# Patient Record
Sex: MALE | NOT HISPANIC OR LATINO | URBAN - NONMETROPOLITAN AREA
[De-identification: names, ages, dates, MRNs, and addresses within clinical notes are randomized per-mention and may not be internally consistent; named-entity substitution may affect disease eponyms.]

---

## 2018-05-08 ENCOUNTER — APPOINTMENT (RX ONLY)
Dept: URBAN - NONMETROPOLITAN AREA CLINIC 30 | Facility: CLINIC | Age: 28
Setting detail: DERMATOLOGY
End: 2018-05-08

## 2018-05-08 VITALS — SYSTOLIC BLOOD PRESSURE: 130 MMHG | HEIGHT: 73 IN | WEIGHT: 210 LBS | DIASTOLIC BLOOD PRESSURE: 80 MMHG

## 2018-05-08 DIAGNOSIS — L663 OTHER SPECIFIED DISEASES OF HAIR AND HAIR FOLLICLES: ICD-10-CM

## 2018-05-08 DIAGNOSIS — L30.1 DYSHIDROSIS [POMPHOLYX]: ICD-10-CM

## 2018-05-08 DIAGNOSIS — L73.9 FOLLICULAR DISORDER, UNSPECIFIED: ICD-10-CM

## 2018-05-08 DIAGNOSIS — L738 OTHER SPECIFIED DISEASES OF HAIR AND HAIR FOLLICLES: ICD-10-CM

## 2018-05-08 PROBLEM — L02.02 FURUNCLE OF FACE: Status: ACTIVE | Noted: 2018-05-08

## 2018-05-08 PROCEDURE — ? PRESCRIPTION

## 2018-05-08 PROCEDURE — ? INTRALESIONAL KENALOG

## 2018-05-08 PROCEDURE — 11900 INJECT SKIN LESIONS </W 7: CPT

## 2018-05-08 PROCEDURE — ? COUNSELING

## 2018-05-08 RX ORDER — CLOBETASOL PROPIONATE 0.5 MG/G
CREAM TOPICAL BID
Qty: 1 | Refills: 3 | Status: ERX | COMMUNITY
Start: 2018-05-08

## 2018-05-08 RX ADMIN — CLOBETASOL PROPIONATE: 0.5 CREAM TOPICAL at 17:25

## 2018-05-08 ASSESSMENT — LOCATION SIMPLE DESCRIPTION DERM
LOCATION SIMPLE: RIGHT CHEEK
LOCATION SIMPLE: LEFT PLANTAR SURFACE

## 2018-05-08 ASSESSMENT — LOCATION DETAILED DESCRIPTION DERM
LOCATION DETAILED: RIGHT INFERIOR MEDIAL BUCCAL CHEEK
LOCATION DETAILED: LEFT PLANTAR FOREFOOT OVERLYING 2ND METATARSAL

## 2018-05-08 ASSESSMENT — LOCATION ZONE DERM
LOCATION ZONE: FEET
LOCATION ZONE: FACE

## 2018-05-08 NOTE — PROCEDURE: INTRALESIONAL KENALOG
Concentration Of Kenalog Solution Injected (Mg/Ml): 0.5
Expiration Date For Kenalog (Optional): 02/2019
X Size Of Lesion In Cm (Optional): 0
Include Z78.9 (Other Specified Conditions Influencing Health Status) As An Associated Diagnosis?: No
Administered By (Optional): Dr. Tracy Oneal MD
Kenalog Preparation: Kenalog with normal saline
Detail Level: Detailed
Total Volume (Ccs): 0.6
Lot # For Kenalog (Optional): [id number]
Medical Necessity Clause: This procedure was medically necessary because the lesions that were treated were
Consent: The risks of atrophy were reviewed with the patient.

## 2018-11-02 ENCOUNTER — RX ONLY (OUTPATIENT)
Age: 28
Setting detail: RX ONLY
End: 2018-11-02

## 2018-11-02 RX ORDER — CLOBETASOL PROPIONATE 0.5 MG/G
CREAM TOPICAL BID
Qty: 1 | Refills: 1 | Status: ERX

## 2019-02-21 ENCOUNTER — APPOINTMENT (RX ONLY)
Dept: URBAN - NONMETROPOLITAN AREA CLINIC 30 | Facility: CLINIC | Age: 29
Setting detail: DERMATOLOGY
End: 2019-02-21

## 2019-02-21 VITALS — HEIGHT: 72 IN | SYSTOLIC BLOOD PRESSURE: 126 MMHG | WEIGHT: 215 LBS | DIASTOLIC BLOOD PRESSURE: 80 MMHG

## 2019-02-21 DIAGNOSIS — L30.1 DYSHIDROSIS [POMPHOLYX]: ICD-10-CM

## 2019-02-21 PROCEDURE — ? IN-HOUSE DISPENSING PHARMACY

## 2019-02-21 PROCEDURE — ? PRESCRIPTION

## 2019-02-21 PROCEDURE — ? COUNSELING

## 2019-02-21 PROCEDURE — ? PATIENT SPECIFIC COUNSELING

## 2019-02-21 PROCEDURE — 99213 OFFICE O/P EST LOW 20 MIN: CPT

## 2019-02-21 PROCEDURE — ? ORDER TESTS

## 2019-02-21 RX ORDER — DOXYCYCLINE HYCLATE 100 MG/1
CAPSULE, GELATIN COATED ORAL
Qty: 14 | Refills: 0 | Status: ERX | COMMUNITY
Start: 2019-02-21

## 2019-02-21 RX ORDER — PREDNISONE 10 MG/1
TABLET ORAL
Qty: 20 | Refills: 0 | Status: ERX | COMMUNITY
Start: 2019-02-21

## 2019-02-21 RX ADMIN — PREDNISONE: 10 TABLET ORAL at 19:18

## 2019-02-21 RX ADMIN — DOXYCYCLINE HYCLATE: 100 CAPSULE, GELATIN COATED ORAL at 19:19

## 2019-02-21 ASSESSMENT — LOCATION DETAILED DESCRIPTION DERM
LOCATION DETAILED: RIGHT PLANTAR FOREFOOT OVERLYING 3RD METATARSAL
LOCATION DETAILED: LEFT MEDIAL PLANTAR MIDFOOT
LOCATION DETAILED: LEFT INSTEP

## 2019-02-21 ASSESSMENT — LOCATION SIMPLE DESCRIPTION DERM
LOCATION SIMPLE: RIGHT PLANTAR SURFACE
LOCATION SIMPLE: LEFT PLANTAR SURFACE

## 2019-02-21 ASSESSMENT — LOCATION ZONE DERM: LOCATION ZONE: FEET

## 2019-02-21 NOTE — PROCEDURE: PATIENT SPECIFIC COUNSELING
Detail Level: Zone
Gilberto Marino MD in to see patient and recommended treatment options. Advised once patient’s hands and feet are healed he considered using qbrexa and glycopyrolate. Patient will follow up in one week.

## 2019-02-21 NOTE — PROCEDURE: IN-HOUSE DISPENSING PHARMACY
Product 36 Unit Type: mg
Product 61 Refills: 0
Render Product Pricing In Note: Yes
Product 8 Unit Type: grams
Product 12 Amount/Unit (Numbers Only): 30
Product 1 Price/Unit (In Dollars): 35.00
Name Of Product 26: Fungal Dermatitis Cream
Name Of Product 28: Triamcinolone Cream
Product 2 Price/Unit (In Dollars): 50.00
Product 9 [place]arez/Unit (In Dollars): 45.00
Name Of Product 22: Clobetasol Cream
Detail Level: Zone
Name Of Product 2: Acne Triple Gel Combination
Name Of Product 31: Eczema Moisturizing Cream
Product 29 Price/Unit (In Dollars): 65.00
Name Of Product 8: Tretinoin 0.025% / Clindamycin Combination Cream
Product 28 Price/Unit (In Dollars): 30.00
Product 23 Refills: 3
Product 26 Price/Unit (In Dollars): 40.00
Product 32 Refills: 2
Product 8 Application Directions: Apply nightly to face.
Product 5 Amount/Unit (Numbers Only): 120
Product 4 Refills: 5
Name Of Product 12: Rosacea Triple Combination Gel
Name Of Product 41: AK Imiquimod Gel
Product 4 Application Directions: Apply bid to face
Product 24 Amount/Unit (Numbers Only): 60
Name Of Product 3: Adapalene 0.3% Gel
Name Of Product 5: BP 8% Acne Wash
Name Of Product 27: Hydrocortisone 2.5% / Tranilast 0.5% / Levo 2%
Name Of Product 6: Clindamycin Gel
Name Of Product 32: Tacrolimus 0.1% Ointment
Product 42 Application Directions: Apply every third night for two weeks.  Then apply every other night for two weeks and then nightly as tolerated.
Name Of Product 24: Fluocinolone Scalp and Body Oil
Name Of Product 42: Skin Brightening Hydroquinone 8% Combination
Name Of Product 11: Metronidazole Gel
Product 9 Application Directions: Apply pea sized amount at night to entire face. Start every other night and increase to nightly if well-tolerated
Product 32 Application Directions: Twice a day to affected areas of vitiligo on hands
Name Of Product 23: Clobetasol Ointment
Name Of Product 9: Hydrating 0.05% Tretinoin Cream
Name Of Product 4: BP 2.5% / Clindamycin Combination Gel
Name Of Product 21: Anti-Fungal Shampoo
Product 23 Units Dispensed: 1
Name Of Product 7: Dapsone 6% Gel
Name Of Product 1: Acne Rosacea Lotion Cleanser
Name Of Product 25: Fluocinonide Cream

## 2019-02-21 NOTE — PROCEDURE: MIPS QUALITY
Quality 265: Biopsy Follow-Up: Biopsy results reviewed, communicated, tracked, and documented
Detail Level: Generalized
Quality 130: Documentation Of Current Medications In The Medical Record: Current Medications Documented
Quality 431: Preventive Care And Screening: Unhealthy Alcohol Use - Screening: Patient screened for unhealthy alcohol use using a single question and scores less than 2 times per year
Quality 128: Preventive Care And Screening: Body Mass Index (Bmi) Screening And Follow-Up Plan: BMI is documented within normal parameters and no follow-up plan is required.

## 2019-02-28 ENCOUNTER — APPOINTMENT (RX ONLY)
Dept: URBAN - NONMETROPOLITAN AREA CLINIC 30 | Facility: CLINIC | Age: 29
Setting detail: DERMATOLOGY
End: 2019-02-28

## 2019-02-28 VITALS — HEIGHT: 73 IN | SYSTOLIC BLOOD PRESSURE: 128 MMHG | DIASTOLIC BLOOD PRESSURE: 90 MMHG | WEIGHT: 215 LBS

## 2019-02-28 DIAGNOSIS — L30.1 DYSHIDROSIS [POMPHOLYX]: ICD-10-CM

## 2019-02-28 PROCEDURE — ? COUNSELING

## 2019-02-28 PROCEDURE — ? PATIENT SPECIFIC COUNSELING

## 2019-02-28 PROCEDURE — 99212 OFFICE O/P EST SF 10 MIN: CPT

## 2019-02-28 ASSESSMENT — LOCATION DETAILED DESCRIPTION DERM
LOCATION DETAILED: RIGHT THENAR EMINENCE
LOCATION DETAILED: LEFT MEDIAL PLANTAR MIDFOOT
LOCATION DETAILED: LEFT THENAR EMINENCE
LOCATION DETAILED: RIGHT MEDIAL PLANTAR MIDFOOT

## 2019-02-28 ASSESSMENT — LOCATION SIMPLE DESCRIPTION DERM
LOCATION SIMPLE: RIGHT PLANTAR SURFACE
LOCATION SIMPLE: RIGHT HAND
LOCATION SIMPLE: LEFT PLANTAR SURFACE
LOCATION SIMPLE: LEFT HAND

## 2019-02-28 ASSESSMENT — LOCATION ZONE DERM
LOCATION ZONE: FEET
LOCATION ZONE: HAND

## 2019-02-28 NOTE — PROCEDURE: MIPS QUALITY
Quality 265: Biopsy Follow-Up: Biopsy results reviewed, communicated, tracked, and documented
Detail Level: Generalized
Quality 128: Preventive Care And Screening: Body Mass Index (Bmi) Screening And Follow-Up Plan: BMI is documented within normal parameters and no follow-up plan is required.
Quality 431: Preventive Care And Screening: Unhealthy Alcohol Use - Screening: Patient screened for unhealthy alcohol use using a single question and scores less than 2 times per year
Quality 130: Documentation Of Current Medications In The Medical Record: Current Medications Documented

## 2019-02-28 NOTE — PROCEDURE: PATIENT SPECIFIC COUNSELING
Areas have improved significantly, recommended patient to continue clobetasol twice a week for two weeks then try qbrexa. Patient was given samples for one week, then will let us know if he wants us to send in a prescription.
Detail Level: Zone

## 2025-07-14 ENCOUNTER — APPOINTMENT (OUTPATIENT)
Dept: URBAN - NONMETROPOLITAN AREA CLINIC 30 | Facility: CLINIC | Age: 35
Setting detail: DERMATOLOGY
End: 2025-07-14

## 2025-07-14 DIAGNOSIS — D49.2 NEOPLASM OF UNSPECIFIED BEHAVIOR OF BONE, SOFT TISSUE, AND SKIN: ICD-10-CM

## 2025-07-14 PROCEDURE — ? SHAVE REMOVAL

## 2025-07-14 PROCEDURE — ? COUNSELING

## 2025-07-14 ASSESSMENT — LOCATION SIMPLE DESCRIPTION DERM: LOCATION SIMPLE: LEFT LIP

## 2025-07-14 ASSESSMENT — LOCATION ZONE DERM: LOCATION ZONE: LIP

## 2025-07-14 ASSESSMENT — LOCATION DETAILED DESCRIPTION DERM: LOCATION DETAILED: LEFT SUPERIOR VERMILION LIP
